# Patient Record
Sex: MALE | Race: WHITE | URBAN - METROPOLITAN AREA
[De-identification: names, ages, dates, MRNs, and addresses within clinical notes are randomized per-mention and may not be internally consistent; named-entity substitution may affect disease eponyms.]

---

## 2019-01-01 ENCOUNTER — HOSPITAL ENCOUNTER (EMERGENCY)
Facility: HOSPITAL | Age: 64
End: 2019-01-01
Attending: EMERGENCY MEDICINE

## 2019-01-01 DIAGNOSIS — I46.9 CARDIOPULMONARY ARREST (HCC): Primary | ICD-10-CM

## 2019-01-01 PROCEDURE — 99285 EMERGENCY DEPT VISIT HI MDM: CPT

## 2019-01-01 PROCEDURE — 92950 HEART/LUNG RESUSCITATION CPR: CPT

## 2019-06-16 NOTE — ED NOTES
Spoke with Krishna from [facility]. Pt is a candidate for tissue and eye donation. Case number #[id number]. Waiting to hear back from .

## 2019-06-16 NOTE — ED NOTES
Spoke with . Waiting for pts daughter to come in.  Sofia Shone from the coroners office would like to speak with the pt, so she can decide if the ptsd body will be released

## 2019-06-16 NOTE — ED PROVIDER NOTES
Patient Seen in: [facility] Emergency Department    History   Patient presents with:  Cardiac Arrest (cardiovascular)    Stated Complaint: full arrest    HPI    Patient is an elderly male brought in in full arrest.  He reportedly called the parame your blood pressure. Medications Prescribed:  There are no discharge medications for this patient.

## 2019-06-16 NOTE — ED PROVIDER NOTES
Pt's daughter arrived. She was updated on patient condition. She reports patient had a history of a infected heart valve, cancer, and a leg infection. All questions were answered.  was present.

## 2019-06-16 NOTE — ED NOTES
Unable to reach the pts primary doctor (Dr. Andrew Soni @ [facility]). Per Dr. Stewart Serrano, Dr. Indio Ernst will not be reachable until Monday,.

## 2019-06-16 NOTE — ED NOTES
Spoke with Francia Gupta from [address] [place] [address] 's office. Per Francia Gupta, the body is to be released.